# Patient Record
Sex: FEMALE | Race: WHITE
[De-identification: names, ages, dates, MRNs, and addresses within clinical notes are randomized per-mention and may not be internally consistent; named-entity substitution may affect disease eponyms.]

---

## 2017-05-03 ENCOUNTER — HOSPITAL ENCOUNTER (OUTPATIENT)
Dept: HOSPITAL 56 - MW.CHOBGYN | Age: 52
End: 2017-05-03
Attending: ADVANCED PRACTICE MIDWIFE
Payer: COMMERCIAL

## 2017-05-03 DIAGNOSIS — N92.0: Primary | ICD-10-CM

## 2017-05-11 ENCOUNTER — HOSPITAL ENCOUNTER (OUTPATIENT)
Dept: HOSPITAL 56 - MW.SDS | Age: 52
LOS: 1 days | Discharge: HOME | End: 2017-05-12
Attending: OBSTETRICS & GYNECOLOGY
Payer: COMMERCIAL

## 2017-05-11 DIAGNOSIS — D25.9: ICD-10-CM

## 2017-05-11 DIAGNOSIS — N85.00: Primary | ICD-10-CM

## 2017-05-11 DIAGNOSIS — N92.1: ICD-10-CM

## 2017-05-11 DIAGNOSIS — D64.9: ICD-10-CM

## 2017-05-11 LAB
CHLORIDE SERPL-SCNC: 108 MMOL/L (ref 98–110)
SODIUM SERPL-SCNC: 140 MMOL/L (ref 136–146)

## 2017-05-11 PROCEDURE — 86900 BLOOD TYPING SEROLOGIC ABO: CPT

## 2017-05-11 PROCEDURE — 86901 BLOOD TYPING SEROLOGIC RH(D): CPT

## 2017-05-11 PROCEDURE — 86850 RBC ANTIBODY SCREEN: CPT

## 2017-05-11 PROCEDURE — 80048 BASIC METABOLIC PNL TOTAL CA: CPT

## 2017-05-11 PROCEDURE — 58571 TLH W/T/O 250 G OR LESS: CPT

## 2017-05-11 PROCEDURE — 85025 COMPLETE CBC W/AUTO DIFF WBC: CPT

## 2017-05-11 PROCEDURE — 84703 CHORIONIC GONADOTROPIN ASSAY: CPT

## 2017-05-11 PROCEDURE — 36415 COLL VENOUS BLD VENIPUNCTURE: CPT

## 2017-05-11 RX ADMIN — OXYCODONE HYDROCHLORIDE AND ACETAMINOPHEN PRN TAB: 5; 325 TABLET ORAL at 18:36

## 2017-05-11 NOTE — OR
SURGEON:

John Godoy MD

 

DATE OF PROCEDURE:

 

PREOPERATIVE DIAGNOSES:

Menometrorrhagia, fibroid uterus, anemia, and endometrial hyperplasia by

endometrial biopsy.

 

POSTOPERATIVE DIAGNOSES:

Menometrorrhagia, fibroid uterus, anemia, and endometrial hyperplasia by

endometrial biopsy.

 

OPERATION PERFORMED:

Total laparoscopic hysterectomy, laparoscopic bilateral salpingo-oophorectomy,

and cystoscopy.

 

ASSISTANT:

ADELA Morton.

 

ANESTHESIA:

General endotracheal intubation, Dr. Eubanks and Feli Rios.

 

ESTIMATED BLOOD LOSS:

250 mL.

 

COMPLICATIONS:

None.

 

FINDINGS:

Multiple fibroid uterus about 15 to 16-week size.

 

INDICATION FOR SURGERY:

Read or refer to the admit note.

 

PROCEDURE IN DETAIL:

The patient was brought to the OR, properly identified, and after adequate level

of general anesthesia, the patient was placed in lithotomy position with an

access to the abdomen and the vagina.  Goodman catheter was placed in the bladder

and the uterine manipulator and  Ha surgical manipulator placed in

place, then the operation shifted abdominally.  Stab wound done beneath the

umbilicus.  The Veress needle was placed in the peritoneal cavity and that

cavity was insufflated with 6 L of carbon dioxide.  The skin incision was

enlarged to accommodate 5-mm trocar and the scope through it utilizing the

Visiport technique to enter the peritoneal cavity.  Once we entered the

peritoneal cavity, 10/12 trocar was placed in the left iliac fossa under direct

vision and 5-mm trocar in the right iliac fossa.  The patient was placed in

steep Trendelenburg and the operation was started by finding the landmark of the

pelvis.  Then, the superior pedicle coagulated and transected using the ACE-7

Harmonic scapula, and the tubes and ovary included with the specimen.  The same

thing was done with the round ligament.  Then, the anterior leaf of the broad

ligament dissected downward medially pushing the bladder completely away from

the operative field, and then the uterine vessel was identified at the level of

the internal rings of the manipulator.  These uterine vessels were coagulated

and transected with ACE-7 Harmonic scapula making sure that the ureter was away

from harm's way.  Once that was done, then the circular incision at the vaginal

edge of the rings of the manipulator and the uterus was detached from its

attachment to the vagina, the uterus was delivered vaginally with all its

appendage, and then thorough irrigation of the pelvis was done.  There was an

area of the bleeding from the area of the uterine artery on the right side that

was picked individually and Endoloop was applied to it and the bleeding was

stopped.  We proceeded to close the vaginal cuff laparoscopically using 2-0 PDS

interrupted suture.  While we were doing that, we asked the anesthesiologist to

give the patient fluorescein and after that, cystoscopy was performed.  The

bladder was intact.  Both ureteric orifices were seen with urine and dye coming

from both of them.  Thus, the patency of both ureters was verified.  The

cystoscope was removed and the Goodman catheter replaced in the bladder for

drainage.  Instrument and sponge count was correct.  The laparoscopic incision

was closed in layers.  The patient tolerated the procedure well and went to

recovery room in stable general condition.

 

 

JULEE / BHAVNA

DD:  05/11/2017 13:23:17

DT:  05/11/2017 22:10:25

Job #:  [id number]

## 2017-05-11 NOTE — [id number]
Post Anesthesia Note





- EVALUATION WITHIN 48HRS OF ANESTHETIC


Vital Signs in Normal Range: Yes


Patient Participated in Evaluation: Yes


Respiratory Function Stable: Yes


Airway Patent: Yes


Cardiovascular Function Stable: Yes


Hydration Status Stable: Yes


Pain Control Satisfactory: Yes


Nausea and Vomiting Control Satisfactory: No (See note following)


Mental Status Recovered: Yes





- COMMENTS/OBSERVATIONS


Free Text/Narrative:: 





Pt verbalizes frustration over being very sensitive to anesthesia and having 

had excessive nausea and vomiting after all surgeries with only one exception.  

She states that for her "teeth surgery" the anesthesia provider gave her Zofran 

and Phenergan.  While she was still somewhat nauseated, she did not have any 

emesis.  She was not aware of the antinausea modalities with this anesthetic 

today and thought that her scopolamine patch was the only prophylactic or 

treatment given.  I reviewed the anesthesia record and did inform her that she 

received decadron and zofran in addition to the scopolamine patch.  I also 

educated her on the fact that a certain percentage of people have nausea 

regardless of any treatment.  She did say that she had received a "shot" today 

which she thought was phenergan and that helped to relieve her symptoms of 

nausea and vomiting.  In the future patient was advised to request zofran, 

decadron, phenergan and scopolamine patch for prevention and treatment of 

nausea if under anesthesia.  Pt was more calm at end of discussion.  Pt did not 

verbalize any other concerns.

## 2017-05-11 NOTE — PCM.OPNOTE
- General Post-Op/Procedure Note


Date of Surgery/Procedure: 05/11/17


Operative Procedure(s): TLH BSO cysto


Pre Op Diagnosis: juan manuel, Fibroid


Post-Op Diagnosis: Same


Anesthesia Technique: General ET tube


Primary Surgeon: John Godoy


Assistant: Sommer Jackson


EBL in mLs: 200


Complications: None


Condition: Good

## 2017-05-11 NOTE — PCM.PREANE
Preanesthetic Assessment





- Anesthesia/Transfusion/Family Hx


Anesthesia History: Prior Anesthesia Without Reaction


Family History of Anesthesia Reaction: No


Transfusion History: No Prior Transfusion(s)


Intubation History: Unknown





- Review of Systems


General: No Symptoms


Pulmonary: No Symptoms


Cardiovascular: No Symptoms


Gastrointestinal: No symptoms


Neurological: No Symptoms


Other: Reports: None





- Physical Assessment


NPO Status Date: 05/10/17


NPO Status Time: 21:30


O2 Sat by Pulse Oximetry: 97


Respiratory Rate: 16


Vital Signs: 





 Last Vital Signs











Temp  36.9 C   05/11/17 10:05


 


Pulse  72   05/11/17 10:05


 


Resp  16   05/11/17 10:05


 


BP  124/78   05/11/17 10:05


 


Pulse Ox  97   05/11/17 10:05











Height: 1.65 m


Weight: 109.316 kg


ASA Class: 2


Mental Status: Alert & Oriented x3


Airway Class: Mallampati = 2


Dentition: Reports: Normal Dentition


Thyro-Mental Finger Breadths: 3


Mouth Opening Finger Breadths: 3


ROM/Head Extension: Full


Lungs: Clear to auscultation, Normal respiratory effort


Cardiovascular: Regular Rate, Regular Rhythm





- Lab


Values: 





 Laboratory Last Values











WBC  8.70 K/uL (4.0-11.0)   05/11/17  09:52    


 


RBC  3.58 M/uL (4.30-5.90)  L  05/11/17  09:52    


 


Hgb  9.9 g/dL (12.0-16.0)  L  05/11/17  09:52    


 


Hct  31.3 % (36.0-46.0)  L  05/11/17  09:52    


 


MCV  87.4 fL (80.0-98.0)   05/11/17  09:52    


 


MCH  27.7 pg (27.0-32.0)   05/11/17  09:52    


 


MCHC  31.6 g/dL (31.0-37.0)   05/11/17  09:52    


 


RDW Std Deviation  45.6 fl (28.0-62.0)   05/11/17  09:52    


 


RDW Coeff of Felton  15 % (11.0-15.0)   05/11/17  09:52    


 


Plt Count  280 K/uL (150-400)   05/11/17  09:52    


 


MPV  10.30 fL (7.40-12.00)   05/11/17  09:52    


 


Neut % (Auto)  66.0 % (48.0-80.0)   05/11/17  09:52    


 


Lymph % (Auto)  23.7 % (16.0-40.0)   05/11/17  09:52    


 


Mono % (Auto)  4.8 % (0.0-15.0)   05/11/17  09:52    


 


Eos % (Auto)  4.9 % (0.0-7.0)   05/11/17  09:52    


 


Baso % (Auto)  0.6 % (0.0-1.5)   05/11/17  09:52    


 


Neut # (Auto)  5.7 K/uL (1.4-5.7)   05/11/17  09:52    


 


Lymph # (Auto)  2.1 K/uL (0.6-2.4)   05/11/17  09:52    


 


Mono # (Auto)  0.4 K/uL (0.0-0.8)   05/11/17  09:52    


 


Eos # (Auto)  0.4 K/uL (0.0-0.7)   05/11/17  09:52    


 


Baso # (Auto)  0.1 K/uL (0.0-0.1)   05/11/17  09:52    


 


Nucleated RBC %  0.0 /100WBC  05/11/17  09:52    


 


Nucleated RBCs #  0 K/uL  05/11/17  09:52    














- Allergies


Allergies/Adverse Reactions: 


 Allergies











Allergy/AdvReac Type Severity Reaction Status Date / Time


 


codeine Allergy  Hives Verified 05/09/17 16:53














- Blood


Blood Available: No





- Anesthesia Plan


Pre-Op Medication Ordered: None





- Acknowledgements


Anesthesia Type Planned: General Anesthesia


Pt an Appropriate Candidate for the Planned Anesthesia: Yes


Alternatives and Risks of Anesthesia Discussed w Pt/Guardian: Yes


Pt/Guardian Understands and Agrees with Anesthesia Plan: Yes





PreAnesthesia Questionnaire


Other HEENT History: wears glasses


Cardiovascular History: Reports: None


Respiratory History: Reports: None


Gastrointestinal History: Reports: Colon polyp


Genitourinary History: Reports: None


OB/GYN History: Reports: Dysfunctional uterine bleeding, Fibroids


Musculoskeletal History: Reports: None


Neurological History: Reports: None


Psychiatric History: Reports: None


Endocrine/Metabolic History: Reports: Obesity/BMI 30+


Hematologic History: Reports: None


Immunologic History: Reports: None


Oncologic (Cancer) History: Reports: None


Dermatologic History: Reports: Urticaria, Other (see below)


Other Dermatologic History: mole removed from right breast, 

precancerous.....breaks out in hives for no reason





- Past Surgical History


Head Surgeries/Procedures: Reports: None


HEENT Surgical History: Reports: None


Cardiovascular Surgical History: Reports: None


Respiratory Surgical History: Reports: None


GI Surgical History: Reports: Colonoscopy


Female  Surgical History: Reports: Cervical cryotherapy, Tubal ligation


Endocrine Surgical History: Reports: None


Neurological Surgical History: Reports: None


Musculoskeletal Surgical History: Reports: None


Oncologic Surgical History: Reports: None


Dermatological Surgical History: Reports: None


Other Dermatological Surgeries/Procedures: skin lesion biopsy





- SUBSTANCE USE


Smoking Status *Q: Never Smoker


Recreational Drug Use History: No





- HOME MEDS


Home Medications: 


 Home Meds





Iron Tabs 1 tab PO DAILY 05/09/17 [History]


Multivitamin [Daily Multiple Vitamin] 1 tab PO DAILY 05/09/17 [History]











- CURRENT (IN HOUSE) MEDS


Current Meds: 





 Current Medications





Lactated Ringer's (Ringers, Lactated)  1,000 mls @ 125 mls/hr IV ASDIRECTED NOEMI


   Last Admin: 05/11/17 10:02 Dose:  125 mls/hr


Sodium Chloride (Saline Flush)  10 ml FLUSH ASDIRECTED PRN


   PRN Reason: Keep Vein Open


Sodium Chloride (Saline Flush)  2.5 ml FLUSH ASDIRECTED PRN


   PRN Reason: Keep Vein Open





Discontinued Medications





Dexamethasone (Dexamethasone) Confirm Administered Dose 20 mg .ROUTE .STK-MED 

ONE


   Stop: 05/11/17 08:21


Fentanyl (Sublimaze) Confirm Administered Dose 250 mcg .ROUTE .STK-MED ONE


   Stop: 05/11/17 08:22


Fluorescein Sodium (Ak-Fluor) Confirm Administered Dose 5 ml .ROUTE .STK-MED ONE


   Stop: 05/11/17 06:52


Cefazolin Sodium/Dextrose 2 gm (/ Premix)  50 mls @ 100 mls/hr IV ONETIME ONE


   Stop: 05/10/17 09:03


Midazolam HCl (Versed 1 Mg/Ml) Confirm Administered Dose 2 mg .ROUTE .STK-MED 

ONE


   Stop: 05/11/17 08:22


Octyl Cyanoacrylate (Dermabond Advance) Confirm Administered Dose 1 applic 

.ROUTE .STK-MED ONE


   Stop: 05/11/17 10:13


Ondansetron HCl (Zofran) Confirm Administered Dose 4 mg .ROUTE .STK-MED ONE


   Stop: 05/11/17 08:21


Propofol (Diprivan  20 Ml) Confirm Administered Dose 200 mg .ROUTE .STK-MED ONE


   Stop: 05/11/17 08:22


Rocuronium Bromide (Zemuron) Confirm Administered Dose 100 mg .ROUTE .STK-MED 

ONE


   Stop: 05/11/17 08:21

## 2017-05-11 NOTE — PCM.POSTAN
POST ANESTHESIA ASSESSMENT





- MENTAL STATUS


Mental Status: alert, oriented





- RESPIRATORY


Respiratory Status: respiratory rate WNL, airway patent, O2 saturation stable





- CARDIOVASCULAR


CV Status: pulse rate WNL, blood pressure stable





- GASTROINTESTINAL


GI Status: no symptoms





- PAIN


Pain Score: 0





- POST OP HYDRATION


Hydration Status: adequate & stable





- OBSERVATIONS


Free Text/Narrative:: 





no anesthesia problems

## 2017-05-12 VITALS — DIASTOLIC BLOOD PRESSURE: 51 MMHG | SYSTOLIC BLOOD PRESSURE: 96 MMHG

## 2017-05-12 LAB
CHLORIDE SERPL-SCNC: 106 MMOL/L (ref 98–110)
SODIUM SERPL-SCNC: 136 MMOL/L (ref 136–146)

## 2017-05-12 RX ADMIN — OXYCODONE HYDROCHLORIDE AND ACETAMINOPHEN PRN TAB: 5; 325 TABLET ORAL at 03:38

## 2017-05-12 RX ADMIN — OXYCODONE HYDROCHLORIDE AND ACETAMINOPHEN PRN TAB: 5; 325 TABLET ORAL at 11:00

## 2017-05-12 NOTE — PCM.DCSUM1
**Discharge Summary





- Discharge Data


Discharge Date: 05/12/17


Discharge Disposition: Home, Self-Care 01


Condition: Good





- Patient Summary/Data


Operative Procedure(s) Performed: TLH BSO cysto





- Patient Instructions


Diet: Usual Diet as Tolerated


Activity: As Tolerated


Driving: Do Not Drive


Showering/Bathing: May Shower


Wound/Incision Care: Keep Operative Site/Wound Site Clean and Dry


Notify Provider of: Fever, Increased Pain, Swelling and Redness, Drainage, 

Nausea and/or Vomiting





- Discharge Plan


Home Medications: 


 Home Meds





Iron Tabs 1 tab PO DAILY 05/09/17 [History]


Multivitamin [Daily Multiple Vitamin] 1 tab PO DAILY 05/09/17 [History]








Patient Handouts:  Acetaminophen; Oxycodone tablets, Laparoscopically Assisted 

Vaginal Hysterectomy, Care After


Referrals: 


John Godoy MD [Physician] - 05/23/17 8:30 am





- General Info


Date of Service: 05/12/17


Functional Status: Reports: pain controlled





- Review of Systems


General: Reports: No Symptoms


HEENT: Reports: no symptoms


Pulmonary: Reports: no symptoms


Cardiovascular: Reports: No Symptoms


Gastrointestinal: Reports: No symptoms


Genitourinary: Reports: no symptoms


Musculoskeletal: Reports: no symptoms


Skin: Reports: no symptoms


Neurological: Reports: No Symptoms


Psychiatric: Reports: no symptoms





- Patient Data


Vitals - Most Recent: 


 Last Vital Signs











Temp  36.8 C   05/12/17 04:00


 


Pulse  69   05/12/17 04:00


 


Resp  16   05/12/17 04:00


 


BP  96/51 L  05/12/17 04:00


 


Pulse Ox  96   05/12/17 04:00











Weight - Most Recent: 109.316 kg


I&O - Last 24 hours: 


 Intake & Output











 05/11/17 05/12/17 05/12/17





 22:59 06:59 14:59


 


Intake Total 911 1550 


 


Output Total 200 750 


 


Balance 711 800 











Lab Results - Last 24 hrs: 


 Laboratory Results - last 24 hr











  05/11/17 05/11/17 05/11/17 Range/Units





  09:52 09:52 09:52 


 


WBC    8.70  (4.0-11.0)  K/uL


 


RBC    3.58 L  (4.30-5.90)  M/uL


 


Hgb    9.9 L  (12.0-16.0)  g/dL


 


Hct    31.3 L  (36.0-46.0)  %


 


MCV    87.4  (80.0-98.0)  fL


 


MCH    27.7  (27.0-32.0)  pg


 


MCHC    31.6  (31.0-37.0)  g/dL


 


RDW Std Deviation    45.6  (28.0-62.0)  fl


 


RDW Coeff of Felton    15  (11.0-15.0)  %


 


Plt Count    280  (150-400)  K/uL


 


MPV    10.30  (7.40-12.00)  fL


 


Neut % (Auto)    66.0  (48.0-80.0)  %


 


Lymph % (Auto)    23.7  (16.0-40.0)  %


 


Mono % (Auto)    4.8  (0.0-15.0)  %


 


Eos % (Auto)    4.9  (0.0-7.0)  %


 


Baso % (Auto)    0.6  (0.0-1.5)  %


 


Neut # (Auto)    5.7  (1.4-5.7)  K/uL


 


Lymph # (Auto)    2.1  (0.6-2.4)  K/uL


 


Mono # (Auto)    0.4  (0.0-0.8)  K/uL


 


Eos # (Auto)    0.4  (0.0-0.7)  K/uL


 


Baso # (Auto)    0.1  (0.0-0.1)  K/uL


 


Nucleated RBC %    0.0  /100WBC


 


Nucleated RBCs #    0  K/uL


 


Sodium     (136-146)  mmol/L


 


Potassium     (3.5-5.1)  mmol/L


 


Chloride     ()  mmol/L


 


Carbon Dioxide     (21-31)  mmol/L


 


BUN     (6.0-23.0)  mg/dL


 


Creatinine     (0.6-1.5)  mg/dL


 


Est Cr Clr Drug Dosing     mL/min


 


Estimated GFR (MDRD)     ml/min


 


Glucose     ()  mg/dL


 


Calcium     (8.8-10.8)  mg/dL


 


HCG, Qual   NEGATIVE   (NEG)  


 


Blood Type  A POSITIVE    


 


Antibody Screen  NEGATIVE    














  05/11/17 05/12/17 05/12/17 Range/Units





  09:52 05:00 05:00 


 


WBC   21.47 H   (4.0-11.0)  K/uL


 


RBC   2.83 L   (4.30-5.90)  M/uL


 


Hgb   7.9 L   (12.0-16.0)  g/dL


 


Hct   25.1 L   (36.0-46.0)  %


 


MCV   88.7   (80.0-98.0)  fL


 


MCH   27.9   (27.0-32.0)  pg


 


MCHC   31.5   (31.0-37.0)  g/dL


 


RDW Std Deviation   47.3   (28.0-62.0)  fl


 


RDW Coeff of Felton   15   (11.0-15.0)  %


 


Plt Count   252   (150-400)  K/uL


 


MPV   10.80   (7.40-12.00)  fL


 


Neut % (Auto)   92.6 H   (48.0-80.0)  %


 


Lymph % (Auto)   3.3 L   (16.0-40.0)  %


 


Mono % (Auto)   4.1   (0.0-15.0)  %


 


Eos % (Auto)   0.0   (0.0-7.0)  %


 


Baso % (Auto)   0.0   (0.0-1.5)  %


 


Neut # (Auto)   19.9 H   (1.4-5.7)  K/uL


 


Lymph # (Auto)   0.7   (0.6-2.4)  K/uL


 


Mono # (Auto)   0.9 H   (0.0-0.8)  K/uL


 


Eos # (Auto)   0.0   (0.0-0.7)  K/uL


 


Baso # (Auto)   0.0   (0.0-0.1)  K/uL


 


Nucleated RBC %   0.0   /100WBC


 


Nucleated RBCs #   0   K/uL


 


Sodium  140   136  (136-146)  mmol/L


 


Potassium  4.7   4.3  (3.5-5.1)  mmol/L


 


Chloride  108   106  ()  mmol/L


 


Carbon Dioxide  22   22  (21-31)  mmol/L


 


BUN  13   13  (6.0-23.0)  mg/dL


 


Creatinine  0.8   0.8  (0.6-1.5)  mg/dL


 


Est Cr Clr Drug Dosing  75.70   75.70  mL/min


 


Estimated GFR (MDRD)  > 60.0   > 60.0  ml/min


 


Glucose  99   152 H  ()  mg/dL


 


Calcium  8.9   8.5 L  (8.8-10.8)  mg/dL


 


HCG, Qual     (NEG)  


 


Blood Type     


 


Antibody Screen     











Med Orders - Current: 


 Current Medications





Fentanyl (Sublimaze)  50 mcg IVPUSH SEECOMMENT PRN


   PRN Reason: Pain (moderate 4-6)


Lactated Ringer's (Ringers, Lactated)  1,000 mls @ 100 mls/hr IV ASDIRECTED NOEMI


   Last Admin: 05/12/17 03:26 Dose:  100 mls/hr


Ketorolac Tromethamine (Toradol)  30 mg IVPUSH Q6H PRN


   PRN Reason: Pain (severe 7-10)


   Stop: 05/16/17 13:14


Morphine Sulfate (Morphine)  4 mg IVPUSH Q2H PRN


   PRN Reason: Pain (severe 7-10)


Ondansetron HCl (Zofran)  4 mg IVPUSH Q6H PRN


   PRN Reason: Nausea/Vomiting


   Last Admin: 05/11/17 17:46 Dose:  4 mg


Oxycodone/Acetaminophen (Percocet 325-5 Mg)  2 tab PO Q4H PRN


   PRN Reason: Pain (moderate 4-6)


   Last Admin: 05/12/17 03:38 Dose:  2 tab


Promethazine HCl (Phenadoz)  12.5 - 25 mg RECTAL ASDIRECTED PRN


   PRN Reason: Nausea/Vomiting


Promethazine HCl (Phenergan)  25 mg IM Q6H PRN


   PRN Reason: Nausea/Vomiting


Sodium Chloride (Saline Flush)  10 ml FLUSH ASDIRECTED PRN


   PRN Reason: Keep Vein Open


Sodium Chloride (Saline Flush)  2.5 ml FLUSH ASDIRECTED PRN


   PRN Reason: Keep Vein Open





Discontinued Medications





Belladonna Alkaloids/Opium (B & O Supprettes No. 15a)  1 supp RECTAL ONETIME ONE


   Stop: 05/11/17 12:18


   Last Admin: 05/11/17 20:00 Dose:  Not Given


Cefazolin Sodium (Ancef) Confirm Administered Dose 1 gm .ROUTE .STK-MED ONE


   Stop: 05/11/17 11:48


Dexamethasone (Dexamethasone) Confirm Administered Dose 20 mg .ROUTE .STK-MED 

ONE


   Stop: 05/11/17 08:21


Fentanyl (Sublimaze) Confirm Administered Dose 250 mcg .ROUTE .STK-MED ONE


   Stop: 05/11/17 08:22


Fluorescein Sodium (Ak-Fluor) Confirm Administered Dose 5 ml .ROUTE .ST-MED ONE


   Stop: 05/11/17 06:52


Furosemide (Lasix) Confirm Administered Dose 40 mg .ROUTE .ST-MED ONE


   Stop: 05/11/17 11:51


Glycopyrrolate () Confirm Administered Dose 1 mg .ROUTE .STK-MED ONE


   Stop: 05/11/17 12:44


Hydromorphone HCl (Dilaudid) Confirm Administered Dose 2 mg .ROUTE .ST-MED ONE


   Stop: 05/11/17 12:02


Lactated Ringer's (Ringers, Lactated)  1,000 mls @ 125 mls/hr IV ASDIRECTED NOEMI


   Last Admin: 05/11/17 18:09 Dose:  125 mls/hr


Cefazolin Sodium/Dextrose 2 gm (/ Premix)  50 mls @ 100 mls/hr IV ONETIME ONE


   Stop: 05/10/17 09:03


Cefazolin Sodium/Dextrose (Ancef) Confirm Administered Dose 50 mls @ as 

directed .ROUTE .[facility]-MED ONE


   Stop: 05/11/17 11:47


Sodium Chloride (Normal Saline)  500 mls @ 499 drops/min IV .BOLUS NOEMI


   Stop: 05/11/17 17:45


   Last Admin: 05/11/17 17:01 Dose:  499 drops/min


Ketorolac Tromethamine (Toradol)  30 mg IVPUSH ONETIME ONE


   Stop: 05/11/17 13:15


   Last Admin: 05/11/17 20:00 Dose:  Not Given


Midazolam HCl (Versed 1 Mg/Ml) Confirm Administered Dose 2 mg .ROUTE .ST-MED 

ONE


   Stop: 05/11/17 08:22


Neostigmine Methylsulfate (Neostigmine) Confirm Administered Dose 5 mg .ROUTE 

.ST-MED ONE


   Stop: 05/11/17 12:44


Octyl Cyanoacrylate (Dermabond Advance) Confirm Administered Dose 1 applic 

.ROUTE .ST-[facility] ONE


   Stop: 05/11/17 10:13


Ondansetron HCl (Zofran) Confirm Administered Dose 4 mg .ROUTE .STK-MED ONE


   Stop: 05/11/17 08:21


Propofol (Diprivan  20 Ml) Confirm Administered Dose 200 mg .ROUTE .STK-MED ONE


   Stop: 05/11/17 08:22


Rocuronium Bromide (Zemuron) Confirm Administered Dose 100 mg .ROUTE .STOQO-MED 

ONE


   Stop: 05/11/17 08:21











- Exam


General: Reports: alert, oriented


HEENT: Reports: Pupils equal, Pupils reactive, EOMI, Mucous membr. moist/pink


Neck: Reports: supple


Lungs: Reports: Clear to auscultation, Normal respiratory effort


Cardiovascular: Reports: Regular Rate, Regular Rhythm


Abdomen: Reports: bowel sounds present, soft, no tenderness, no distension


 (Female) Exam: Normal External Exam, Normal Speculum Exam, Normal Bimanual 

Exam


Rectal (Female) Exam: Normal Exam, Normal Rectal Tone


Back Exam: Reports: Normal Inspection, Full Range of Motion


Extremities: Reports: no edema, normal pulses


Skin: Reports: warm, dry, intact


Wound/Incisions: Reports: healing well


Neurological: Reports: no new focal deficit


Psy/Mental Status: Reports: alert, normal affect, normal mood





*Q Meaningful Use (DIS)





- VTE *Q


VTE Criteria *Q: 








- Stroke *Q


Stroke Criteria *Q: 








- AMI *Q


AMI Criteria *Q:

## 2017-05-12 NOTE — PCM.SURGPN
- General Info


Date of Service: 05/12/17


Functional Status: Reports: pain controlled





- Review of Systems


General: Reports: No Symptoms


HEENT: Reports: no symptoms


Pulmonary: Reports: no symptoms


Cardiovascular: Reports: No Symptoms


Gastrointestinal: Reports: No symptoms


Genitourinary: Reports: no symptoms


Musculoskeletal: Reports: no symptoms


Skin: Reports: no symptoms


Neurological: Reports: No Symptoms


Psychiatric: Reports: no symptoms





- Patient Data


Vitals - most recent: 


 Last Vital Signs











Temp  36.8 C   05/12/17 04:00


 


Pulse  69   05/12/17 04:00


 


Resp  16   05/12/17 04:00


 


BP  96/51 L  05/12/17 04:00


 


Pulse Ox  96   05/12/17 04:00











Weight - most recent: 109.316 kg


I&O - last 24 hours: 


 Intake & Output











 05/11/17 05/12/17 05/12/17





 22:59 06:59 14:59


 


Intake Total 911 1550 


 


Output Total 200 750 


 


Balance 711 800 











Lab Results last 24 hrs: 


 Laboratory Results - last 24 hr











  05/11/17 05/11/17 05/11/17 Range/Units





  09:52 09:52 09:52 


 


WBC    8.70  (4.0-11.0)  K/uL


 


RBC    3.58 L  (4.30-5.90)  M/uL


 


Hgb    9.9 L  (12.0-16.0)  g/dL


 


Hct    31.3 L  (36.0-46.0)  %


 


MCV    87.4  (80.0-98.0)  fL


 


MCH    27.7  (27.0-32.0)  pg


 


MCHC    31.6  (31.0-37.0)  g/dL


 


RDW Std Deviation    45.6  (28.0-62.0)  fl


 


RDW Coeff of Felton    15  (11.0-15.0)  %


 


Plt Count    280  (150-400)  K/uL


 


MPV    10.30  (7.40-12.00)  fL


 


Neut % (Auto)    66.0  (48.0-80.0)  %


 


Lymph % (Auto)    23.7  (16.0-40.0)  %


 


Mono % (Auto)    4.8  (0.0-15.0)  %


 


Eos % (Auto)    4.9  (0.0-7.0)  %


 


Baso % (Auto)    0.6  (0.0-1.5)  %


 


Neut # (Auto)    5.7  (1.4-5.7)  K/uL


 


Lymph # (Auto)    2.1  (0.6-2.4)  K/uL


 


Mono # (Auto)    0.4  (0.0-0.8)  K/uL


 


Eos # (Auto)    0.4  (0.0-0.7)  K/uL


 


Baso # (Auto)    0.1  (0.0-0.1)  K/uL


 


Nucleated RBC %    0.0  /100WBC


 


Nucleated RBCs #    0  K/uL


 


Sodium     (136-146)  mmol/L


 


Potassium     (3.5-5.1)  mmol/L


 


Chloride     ()  mmol/L


 


Carbon Dioxide     (21-31)  mmol/L


 


BUN     (6.0-23.0)  mg/dL


 


Creatinine     (0.6-1.5)  mg/dL


 


Est Cr Clr Drug Dosing     mL/min


 


Estimated GFR (MDRD)     ml/min


 


Glucose     ()  mg/dL


 


Calcium     (8.8-10.8)  mg/dL


 


HCG, Qual   NEGATIVE   (NEG)  


 


Blood Type  A POSITIVE    


 


Antibody Screen  NEGATIVE    














  05/11/17 05/12/17 05/12/17 Range/Units





  09:52 05:00 05:00 


 


WBC   21.47 H   (4.0-11.0)  K/uL


 


RBC   2.83 L   (4.30-5.90)  M/uL


 


Hgb   7.9 L   (12.0-16.0)  g/dL


 


Hct   25.1 L   (36.0-46.0)  %


 


MCV   88.7   (80.0-98.0)  fL


 


MCH   27.9   (27.0-32.0)  pg


 


MCHC   31.5   (31.0-37.0)  g/dL


 


RDW Std Deviation   47.3   (28.0-62.0)  fl


 


RDW Coeff of Felton   15   (11.0-15.0)  %


 


Plt Count   252   (150-400)  K/uL


 


MPV   10.80   (7.40-12.00)  fL


 


Neut % (Auto)   92.6 H   (48.0-80.0)  %


 


Lymph % (Auto)   3.3 L   (16.0-40.0)  %


 


Mono % (Auto)   4.1   (0.0-15.0)  %


 


Eos % (Auto)   0.0   (0.0-7.0)  %


 


Baso % (Auto)   0.0   (0.0-1.5)  %


 


Neut # (Auto)   19.9 H   (1.4-5.7)  K/uL


 


Lymph # (Auto)   0.7   (0.6-2.4)  K/uL


 


Mono # (Auto)   0.9 H   (0.0-0.8)  K/uL


 


Eos # (Auto)   0.0   (0.0-0.7)  K/uL


 


Baso # (Auto)   0.0   (0.0-0.1)  K/uL


 


Nucleated RBC %   0.0   /100WBC


 


Nucleated RBCs #   0   K/uL


 


Sodium  140   136  (136-146)  mmol/L


 


Potassium  4.7   4.3  (3.5-5.1)  mmol/L


 


Chloride  108   106  ()  mmol/L


 


Carbon Dioxide  22   22  (21-31)  mmol/L


 


BUN  13   13  (6.0-23.0)  mg/dL


 


Creatinine  0.8   0.8  (0.6-1.5)  mg/dL


 


Est Cr Clr Drug Dosing  75.70   75.70  mL/min


 


Estimated GFR (MDRD)  > 60.0   > 60.0  ml/min


 


Glucose  99   152 H  ()  mg/dL


 


Calcium  8.9   8.5 L  (8.8-10.8)  mg/dL


 


HCG, Qual     (NEG)  


 


Blood Type     


 


Antibody Screen     











Med Orders - Current: 


 Current Medications





Fentanyl (Sublimaze)  50 mcg IVPUSH SEECOMMENT PRN


   PRN Reason: Pain (moderate 4-6)


Lactated Ringer's (Ringers, Lactated)  1,000 mls @ 100 mls/hr IV ASDIRECTED [facility]


   Last Admin: 05/12/17 03:26 Dose:  100 mls/hr


Ketorolac Tromethamine (Toradol)  30 mg IVPUSH Q6H PRN


   PRN Reason: Pain (severe 7-10)


   Stop: 05/16/17 13:14


Morphine Sulfate (Morphine)  4 mg IVPUSH Q2H PRN


   PRN Reason: Pain (severe 7-10)


Ondansetron HCl (Zofran)  4 mg IVPUSH Q6H PRN


   PRN Reason: Nausea/Vomiting


   Last Admin: 05/11/17 17:46 Dose:  4 mg


Oxycodone/Acetaminophen (Percocet 325-5 Mg)  2 tab PO Q4H PRN


   PRN Reason: Pain (moderate 4-6)


   Last Admin: 05/12/17 03:38 Dose:  2 tab


Promethazine HCl (Phenadoz)  12.5 - 25 mg RECTAL ASDIRECTED PRN


   PRN Reason: Nausea/Vomiting


Promethazine HCl (Phenergan)  25 mg IM Q6H PRN


   PRN Reason: Nausea/Vomiting


Sodium Chloride (Saline Flush)  10 ml FLUSH ASDIRECTED PRN


   PRN Reason: Keep Vein Open


Sodium Chloride (Saline Flush)  2.5 ml FLUSH ASDIRECTED PRN


   PRN Reason: Keep Vein Open





Discontinued Medications





Belladonna Alkaloids/Opium (B & O Supprettes No. 15a)  1 supp RECTAL ONETIME ONE


   Stop: 05/11/17 12:18


   Last Admin: 05/11/17 20:00 Dose:  Not Given


Cefazolin Sodium (Ancef) Confirm Administered Dose 1 gm .ROUTE .STK-MED ONE


   Stop: 05/11/17 11:48


Dexamethasone (Dexamethasone) Confirm Administered Dose 20 mg .ROUTE .STK-MED 

ONE


   Stop: 05/11/17 08:21


Fentanyl (Sublimaze) Confirm Administered Dose 250 mcg .ROUTE .STK-MED ONE


   Stop: 05/11/17 08:22


Fluorescein Sodium (Ak-Fluor) Confirm Administered Dose 5 ml .ROUTE .STK-MED ONE


   Stop: 05/11/17 06:52


Furosemide (Lasix) Confirm Administered Dose 40 mg .ROUTE .STK-MED ONE


   Stop: 05/11/17 11:51


Glycopyrrolate () Confirm Administered Dose 1 mg .ROUTE .STK-MED ONE


   Stop: 05/11/17 12:44


Hydromorphone HCl (Dilaudid) Confirm Administered Dose 2 mg .ROUTE .STK-MED ONE


   Stop: 05/11/17 12:02


Lactated Ringer's (Ringers, Lactated)  1,000 mls @ 125 mls/hr IV ASDIRECTED [facility]


   Last Admin: 05/11/17 18:09 Dose:  125 mls/hr


Cefazolin Sodium/Dextrose 2 gm (/ Premix)  50 mls @ 100 mls/hr IV ONETIME ONE


   Stop: 05/10/17 09:03


Cefazolin Sodium/Dextrose (Ancef) Confirm Administered Dose 50 mls @ as 

directed .ROUTE .STK-MED ONE


   Stop: 05/11/17 11:47


Sodium Chloride (Normal Saline)  500 mls @ 499 drops/min IV .BOLUS [facility]


   Stop: 05/11/17 17:45


   Last Admin: 05/11/17 17:01 Dose:  499 drops/min


Ketorolac Tromethamine (Toradol)  30 mg IVPUSH ONETIME ONE


   Stop: 05/11/17 13:15


   Last Admin: 05/11/17 20:00 Dose:  Not Given


Midazolam HCl (Versed 1 Mg/Ml) Confirm Administered Dose 2 mg .ROUTE .STK-MED 

ONE


   Stop: 05/11/17 08:22


Neostigmine Methylsulfate (Neostigmine) Confirm Administered Dose 5 mg .ROUTE 

.STK-MED ONE


   Stop: 05/11/17 12:44


Octyl Cyanoacrylate (Dermabond Advance) Confirm Administered Dose 1 applic 

.ROUTE .STK-MED ONE


   Stop: 05/11/17 10:13


Ondansetron HCl (Zofran) Confirm Administered Dose 4 mg .ROUTE .STK-MED ONE


   Stop: 05/11/17 08:21


Propofol (Diprivan  20 Ml) Confirm Administered Dose 200 mg .ROUTE .STK-MED ONE


   Stop: 05/11/17 08:22


Rocuronium Bromide (Zemuron) Confirm Administered Dose 100 mg .ROUTE .STK-MED 

ONE


   Stop: 05/11/17 08:21











- Exam


Wound/Incisions: healing well


General: alert, oriented


HEENT: Pupils equal


Neck: supple


Lungs: Clear to auscultation, Normal respiratory effort


Cardiovascular: Regular Rate, Regular Rhythm


Abdomen: bowel sounds present, soft, no tenderness, no distension


Extremities: no edema


Skin: warm, dry, intact


Neurological: no new focal deficit


Psy/Mental Status: alert, normal affect, normal mood





- Problem List Review


Problem List Initiated/Reviewed/Updated: Yes





- My Orders


Last 24 Hours: 


 Active Orders 24 hr











 Category Date Time Status


 


 Patient Status [ADT] Routine ADT  05/11/17 13:14 Active


 


 Notify Provider Vital Signs [RC] ASDIRECTED Care  05/11/17 13:14 Active


 


 RT Incentive Spirometry [RC] Q2HWA Care  05/11/17 13:14 Active


 


 Up With Assistance [RC] PER UNIT ROUTINE Care  05/11/17 13:14 Active


 


 Up ad Kristie [RC] PER UNIT ROUTINE Care  05/11/17 13:14 Active


 


 Urinary Catheter Removal [RC] Per Unit Routine Care  05/11/17 13:14 Active


 


 Vital Signs [RC] PER UNIT ROUTINE Care  05/11/17 13:14 Active


 


 Regular Diet [DIET] Diet  05/11/17 Dinner Active


 


 Acetaminophen/oxyCODONE [Percocet 325-5 MG] Med  05/11/17 13:14 Active





 2 tab PO Q4H PRN   


 


 Ketorolac [Toradol] Med  05/11/17 13:14 Active





 30 mg IVPUSH Q6H PRN   


 


 Lactated Ringers [Ringers, Lactated] 1,000 ml Med  05/11/17 21:30 Active





 IV ASDIRECTED   


 


 Morphine Med  05/11/17 13:14 Active





 4 mg IVPUSH Q2H PRN   


 


 Ondansetron [Zofran] Med  05/11/17 13:14 Active





 4 mg IVPUSH Q6H PRN   


 


 Promethazine [Phenadoz] Med  05/11/17 12:17 Active





 12.5 - 25 mg RECTAL ASDIRECTED PRN   


 


 Promethazine [Phenergan] Med  05/11/17 13:14 Active





 25 mg IM Q6H PRN   


 


 fentaNYL [Sublimaze] Med  05/11/17 12:17 Active





 50 mcg IVPUSH SEECOMMENT PRN   


 


 Peripheral IV Discontinue [OM.PC] Routine Oth  05/11/17 13:14 Ordered


 


 Sequential Compression Device [OM.PC] Per Unit Routine Oth  05/11/17 13:14 

Ordered


 


 Resuscitation Status Routine Resus Stat  05/11/17 13:14 Ordered








 Medication Orders





Fentanyl (Sublimaze)  50 mcg IVPUSH SEECOMMENT PRN


   PRN Reason: Pain (moderate 4-6)


Lactated Ringer's (Ringers, Lactated)  1,000 mls @ 100 mls/hr IV ASDIRECTED [facility]


   Last Admin: 05/12/17 03:26  Dose: 100 mls/hr


   Infusion: 05/12/17 03:26  Dose: 100 mls/hr


   Admin: 05/11/17 21:30  Dose: 100 mls/hr


Ketorolac Tromethamine (Toradol)  30 mg IVPUSH Q6H PRN


   PRN Reason: Pain (severe 7-10)


   Stop: 05/16/17 13:14


Morphine Sulfate (Morphine)  4 mg IVPUSH Q2H PRN


   PRN Reason: Pain (severe 7-10)


Ondansetron HCl (Zofran)  4 mg IVPUSH Q6H PRN


   PRN Reason: Nausea/Vomiting


   Last Admin: 05/11/17 17:46  Dose: 4 mg


Oxycodone/Acetaminophen (Percocet 325-5 Mg)  2 tab PO Q4H PRN


   PRN Reason: Pain (moderate 4-6)


   Last Admin: 05/12/17 03:38  Dose: 2 tab


   Admin: 05/11/17 18:36  Dose: 2 tab


Promethazine HCl (Phenadoz)  12.5 - 25 mg RECTAL ASDIRECTED PRN


   PRN Reason: Nausea/Vomiting


Promethazine HCl (Phenergan)  25 mg IM Q6H PRN


   PRN Reason: Nausea/Vomiting


Sodium Chloride (Saline Flush)  10 ml FLUSH ASDIRECTED PRN


   PRN Reason: Keep Vein Open


Sodium Chloride (Saline Flush)  2.5 ml FLUSH ASDIRECTED PRN


   PRN Reason: Keep Vein Open











- Assessment


Assessment           (Free Text/Narrative):: 





Status post total laparoscopic hysterectomy and laparoscopic bilateral salpingo-

oophorectomy postoperative day #1 patient doing well her lab works is stable 

she is ambulatory she is voiding without any problem she is on regular diet 

tolerated very well





- Plan


Plan                        (Free Text/Narrative):: 





Patient will be sent home today post hysterectomy instruction is given to the 

patient prescription for Percocet 7.5/325 was given for postoperative pain the 

post hysterectomy instruction is given to the patient and the patient to have 

an appointment for followup in 2 weeks

## 2017-07-13 ENCOUNTER — HOSPITAL ENCOUNTER (OUTPATIENT)
Dept: HOSPITAL 56 - MW.SDS | Age: 52
Discharge: HOME | End: 2017-07-13
Attending: SURGERY
Payer: COMMERCIAL

## 2017-07-13 VITALS — DIASTOLIC BLOOD PRESSURE: 65 MMHG | SYSTOLIC BLOOD PRESSURE: 106 MMHG

## 2017-07-13 DIAGNOSIS — K64.1: ICD-10-CM

## 2017-07-13 DIAGNOSIS — K57.30: ICD-10-CM

## 2017-07-13 DIAGNOSIS — Z98.51: ICD-10-CM

## 2017-07-13 DIAGNOSIS — Z88.5: ICD-10-CM

## 2017-07-13 DIAGNOSIS — Z90.710: ICD-10-CM

## 2017-07-13 DIAGNOSIS — Z86.010: ICD-10-CM

## 2017-07-13 DIAGNOSIS — Z79.899: ICD-10-CM

## 2017-07-13 DIAGNOSIS — Z98.890: ICD-10-CM

## 2017-07-13 DIAGNOSIS — Z90.79: ICD-10-CM

## 2017-07-13 DIAGNOSIS — Z12.11: Primary | ICD-10-CM

## 2017-07-13 PROCEDURE — 0DJD8ZZ INSPECTION OF LOWER INTESTINAL TRACT, VIA NATURAL OR ARTIFICIAL OPENING ENDOSCOPIC: ICD-10-PCS | Performed by: SURGERY

## 2017-07-13 PROCEDURE — 81025 URINE PREGNANCY TEST: CPT

## 2017-07-13 PROCEDURE — 45378 DIAGNOSTIC COLONOSCOPY: CPT

## 2017-07-13 NOTE — [id number]
Post Anesthesia Note





- EVALUATION WITHIN 48HRS OF ANESTHETIC


Vital Signs in Normal Range: Yes


Patient Participated in Evaluation: Yes


Respiratory Function Stable: Yes


Airway Patent: Yes


Cardiovascular Function Stable: Yes


Hydration Status Stable: Yes


Pain Control Satisfactory: Yes


Nausea and Vomiting Control Satisfactory: Yes


Mental Status Recovered: Yes





- COMMENTS/OBSERVATIONS


Free Text/Narrative:: 





no anesthesia problems

## 2017-07-13 NOTE — PCM.POSTAN
POST ANESTHESIA ASSESSMENT





- MENTAL STATUS


Mental Status: alert





- RESPIRATORY


Respiratory Status: respiratory rate WNL, airway patent, O2 saturation stable





- CARDIOVASCULAR


CV Status: pulse rate WNL, blood pressure stable





- GASTROINTESTINAL


GI Status: no symptoms





- PAIN


Pain Score: 0





- POST OP HYDRATION


Hydration Status: adequate & stable





- OBSERVATIONS


Free Text/Narrative:: 





no anesthesia problems

## 2017-07-13 NOTE — PCM.PREANE
Preanesthetic Assessment





- Anesthesia/Transfusion/Family Hx


Anesthesia History: Prior Anesthesia Without Reaction


Family History of Anesthesia Reaction: No


Transfusion History: No Prior Transfusion(s)


Intubation History: Unknown





- Review of Systems


General: No Symptoms


Pulmonary: No Symptoms


Cardiovascular: No Symptoms


Gastrointestinal: No symptoms


Neurological: No Symptoms


Other: Reports: None





- Physical Assessment


Height: 1.65 m


Weight: 108.409 kg


ASA Class: 2


Mental Status: Alert & Oriented x3


Airway Class: Mallampati = 2


Dentition: Reports: Normal Dentition


Thyro-Mental Finger Breadths: 3


Mouth Opening Finger Breadths: 3


ROM/Head Extension: Full


Lungs: Clear to auscultation, Normal respiratory effort


Cardiovascular: Regular Rate, Regular Rhythm





- Lab


Values: 





 Laboratory Last Values











Urine HCG, Qual  NEGATIVE  (NEGATIVE)   07/13/17  10:45    














- Allergies


Allergies/Adverse Reactions: 


 Allergies











Allergy/AdvReac Type Severity Reaction Status Date / Time


 


codeine Allergy  Hives Verified 05/09/17 16:53














- Blood


Blood Available: No





- Anesthesia Plan


Pre-Op Medication Ordered: None





- Acknowledgements


Anesthesia Type Planned: MAC


Pt an Appropriate Candidate for the Planned Anesthesia: Yes


Alternatives and Risks of Anesthesia Discussed w Pt/Guardian: Yes


Pt/Guardian Understands and Agrees with Anesthesia Plan: Yes





PreAnesthesia Questionnaire


HEENT History: Reports: Other (See Below)


Other HEENT History: wears glasses


Cardiovascular History: Reports: None


Respiratory History: Reports: None


Gastrointestinal History: Reports: Colon Polyp, Diverticulosis


Genitourinary History: Reports: None


OB/GYN History: Reports: Dysfunctional Uterine Bleeding, Fibroids


Musculoskeletal History: Reports: None


Neurological History: Reports: None


Psychiatric History: Reports: None


Endocrine/Metabolic History: Reports: Obesity/BMI 30+


Hematologic History: Reports: Anemia


Immunologic History: Reports: None


Oncologic (Cancer) History: Reports: None


Dermatologic History: Reports: Urticaria, Other (See Below)


Other Dermatologic History: mole removed from right breast, precancerous





- Past Surgical History


Head Surgeries/Procedures: Reports: None


HEENT Surgical History: Reports: None


Cardiovascular Surgical History: Reports: None


Respiratory Surgical History: Reports: None


GI Surgical History: Reports: Colonoscopy (x2 '10 and '11)


Female  Surgical History: Reports: Cervical Cryotherapy, Hysterectomy, 

Salpingo-Oophorectomy, Tubal Ligation


Endocrine Surgical History: Reports: None


Neurological Surgical History: Reports: None


Musculoskeletal Surgical History: Reports: None


Oncologic Surgical History: Reports: None


Dermatological Surgical History: 


Other Dermatological Surgeries/Procedures: skin lesion biopsy





- SUBSTANCE USE


Smoking Status *Q: Never Smoker


Recreational Drug Use History: No





- HOME MEDS


Home Medications: 


 Home Meds





Iron Tabs 1 tab PO DAILY 05/09/17 [History]


Multivitamin [Daily Multiple Vitamin] 1 tab PO DAILY 05/09/17 [History]


Omega-3/DHA/Epa/Fish Oil [Omega-3 Fish Oil 1,000 MG Sfgl] 1 tab PO ASDIRECTED 07 /07/17 [History]


Polyethylene Glycol 3350 [Miralax] 1 dose PO ASDIRECTED PRN 07/07/17 [History]











- CURRENT (IN HOUSE) MEDS


Current Meds: 





 Current Medications





Lactated Ringer's (Ringers, Lactated)  1,000 mls @ 125 mls/hr IV ASDIRECTED [facility]


Sodium Chloride (Saline Flush)  10 ml FLUSH ASDIRECTED PRN


   PRN Reason: Keep Vein Open


Sodium Chloride (Saline Flush)  2.5 ml FLUSH ASDIRECTED PRN


   PRN Reason: Keep Vein Open





Discontinued Medications





Fentanyl (Sublimaze) Confirm Administered Dose 100 mcg .ROUTE .STK-MED ONE


   Stop: 07/13/17 11:25


Lidocaine (Xylocaine-Mpf 2%) Confirm Administered Dose 5 ml .ROUTE .STK-MED ONE


   Stop: 07/13/17 11:25


Propofol (Diprivan  20 Ml) Confirm Administered Dose 400 mg .ROUTE .STK-MED ONE


   Stop: 07/13/17 11:25

## 2017-07-14 NOTE — OR
SURGEON:

DAVID JACKSON MD

 

DATE OF PROCEDURE:  07/13/2017

 

PREOPERATIVE DIAGNOSIS:

History of colon polyps.

 

POSTOPERATIVE DIAGNOSES:

Diverticulosis, grade 2 hemorrhoids.

 

PROCEDURE PERFORMED:

Diagnostic colonoscopy.

 

ANESTHESIA:

MAC.

 

EXTENT OF THE EXAM:

To the cecum.

 

PREPARATION:

Good.

 

LIMITATIONS:

None.

 

INDICATIONS FOR EXAMINATION:

The patient is a 52-year-old female, who has a history of colon polyps removed 7-

8 years ago.  The patient has not followed up since and is due for a repeat

colonoscopy.  We discussed the procedure as well as the risks including

bleeding, infection, or damage to surrounding structures, including perforation.

The patient verbalized understanding and wishes to proceed.

 

PROCEDURE IN DETAIL:

The patient was brought into the endoscopy suite and placed on the cart in a

left lateral decubitus position.  A time-out was completed verifying the

patient's name, age, date of birth, allergies, and procedure to be performed.

Monitored anesthesia care was induced and continuous oxygen was provided via

nasal cannula throughout the procedure.  After adequate sedation was achieved, a

digital rectal exam was performed.  This is consistent with grade 2 hemorrhoids.

A well lubricated colonoscope was inserted into the rectum and advanced under

direct visualization to the level of the cecum.  The cecum was identified by

both visual and anatomic landmarks.  A photograph was taken of the cecal cap,

however, I was unable to retroflex the scope within the cecum.  The scope was

fully withdrawn while examining the color, texture, anatomy, and integrity of

the mucosa from the cecum to the anal canal.  This is consistent with diffuse

diverticulosis throughout the colon.  The scope was brought into the rectum and

retroflexed to allow visualization of the anal canal opening.  This appeared

normal and a photograph was taken.  The scope was straightened out and removed

from the patient.  The cecum to anus time was 8 minutes.  The patient tolerated

the procedure well and was taken to the PACU in stable condition.

 

ENDOSCOPIC DIAGNOSIS:

Diverticulosis, grade 2 hemorrhoids.

 

RECOMMENDATION:

Follow up in clinic in 2 weeks.

 

 

MEAGHAN HUGGINS

DD:  07/14/2017 08:00:56

DT:  07/14/2017 09:04:56

Job #:  [id number]

## 2019-02-04 ENCOUNTER — HOSPITAL ENCOUNTER (EMERGENCY)
Dept: HOSPITAL 56 - MW.ED | Age: 54
Discharge: HOME | End: 2019-02-04
Payer: COMMERCIAL

## 2019-02-04 VITALS — DIASTOLIC BLOOD PRESSURE: 88 MMHG | SYSTOLIC BLOOD PRESSURE: 116 MMHG

## 2019-02-04 DIAGNOSIS — Z88.5: ICD-10-CM

## 2019-02-04 DIAGNOSIS — R07.9: Primary | ICD-10-CM

## 2019-02-04 DIAGNOSIS — E66.9: ICD-10-CM

## 2019-02-04 LAB
CHLORIDE SERPL-SCNC: 105 MMOL/L (ref 98–107)
SODIUM SERPL-SCNC: 140 MMOL/L (ref 136–145)

## 2019-02-04 PROCEDURE — 82150 ASSAY OF AMYLASE: CPT

## 2019-02-04 PROCEDURE — 84484 ASSAY OF TROPONIN QUANT: CPT

## 2019-02-04 PROCEDURE — 85025 COMPLETE CBC W/AUTO DIFF WBC: CPT

## 2019-02-04 PROCEDURE — 86677 HELICOBACTER PYLORI ANTIBODY: CPT

## 2019-02-04 PROCEDURE — 85610 PROTHROMBIN TIME: CPT

## 2019-02-04 PROCEDURE — 36415 COLL VENOUS BLD VENIPUNCTURE: CPT

## 2019-02-04 PROCEDURE — 99285 EMERGENCY DEPT VISIT HI MDM: CPT

## 2019-02-04 PROCEDURE — 80053 COMPREHEN METABOLIC PANEL: CPT

## 2019-02-04 PROCEDURE — 71045 X-RAY EXAM CHEST 1 VIEW: CPT

## 2019-02-04 PROCEDURE — 93005 ELECTROCARDIOGRAM TRACING: CPT

## 2019-02-04 PROCEDURE — 83690 ASSAY OF LIPASE: CPT

## 2019-02-04 NOTE — EDM.PDOC
ED HPI GENERAL MEDICAL PROBLEM





- General


Chief Complaint: Chest Pain


Stated Complaint: CHEST PAIN


Time Seen by Provider: 02/04/19 08:42





- History of Present Illness


INITIAL COMMENTS - FREE TEXT/NARRATIVE: 





HISTORY AND PHYSICAL:





History of present illness:


The patient is a 53-year-old female was no significant past medical history and 

who had a total hysterectomy and presents with 4 days of episodic midsternal 

chest pain which she describes as a cramping aching like sensation. She says 

that it only lasts for a few minutes when it occurs and it occurs about 15 

times a day for the last 4 days. It never wakes her from sleep and is not 

associated with any other symptoms such as nausea vomiting diaphoresis or 

shortness of breath. She does say that activity makes it worse and she says 

that yesterday when she was shoveling some snow she did not have the discomfort 

during activity but she did have it afterwards and also when she was going up 

and downstairs to do laundry. When the pain occurs she rates it as a 2-3/10 and 

sometimes there is an undercurrent of an aching sensation but most of the time 

it goes away completely. She has not taken any medicines specifically for this 

pain. Currently in the ED she has no discomfort. She had a stress test about 5 

years ago that was normal but she has never had any other cardiac workup and 

has not had a lipid or cholesterol profile performed recently. She is not sure 

of her father had cardiac disease as it was a long time ago and her mom has a 

history of A. fib but there is no other cardiac disease in the family. Patient 

denies social history and no recent long trips. She has no leg pain or 

swelling. She has no upper respiratory symptoms. Currently in the ED she tells 

me that she only came in today because she knows something that she needs to 

look into that she is asymptomatic. The patient denies any epigastric pain and 

has no acid reflux-like sensations and no food intolerance.





Review of systems: 


As per history of present illness and below otherwise all systems reviewed and 

negative.





Past medical history: 


As per history of present illness and as reviewed below otherwise 

noncontributory.





Surgical history: 


As per history of present illness and as reviewed below otherwise 

noncontributory.





Social history: 


No reported history of drug or alcohol abuse.





Family history: 


As per history of present illness and as reviewed below otherwise 

noncontributory.





Physical exam:


General: Well-developed well-nourished mildly overweight female who is nontoxic 

and vital signs were noted by me.


HEENT: Atraumatic, normocephalic,  negative for conjunctival pallor or scleral 

icterus, mucous membranes moist, throat clear, neck supple, nontender, trachea 

midline.


Lungs: Clear to auscultation, breath sounds equal bilaterally, chest nontender.


Heart: S1S2, regular rate and rhythm no overt murmurs


Abdomen: Soft, nondistended, nontender. Negative for masses or 

hepatosplenomegaly. NABS


Pelvis: Stable nontender.


Genitourinary: Deferred.


Rectal: Deferred.


Extremities: Atraumatic, negative for cords or calf pain. Neurovascular 

unremarkable. No pedal edema or leg asymmetry


Neuro: Awake, alert, oriented. Cranial nerves II through XII unremarkable. 

Cerebellum unremarkable. Motor and sensory unremarkable throughout. Exam 

nonfocal.





Diagnostics:


EKG chest x-ray CBC CMP INR troponin amylase lipase H. pylori





Therapeutics:


IV O2 monitor aspirin





Discussed with patient and  at bedside all testing results and have 

offered her observation admission for these symptoms. She understands my 

concerns and accepts them and would prefer to do an outpatient follow-up. We 

will place her name on an expedited follow-up list and I've advised her on 

reasons to return to the ED. She understands that I cannot actually say that 

this is not reactive in etiology without the observation admission and she 

still declines





Impression: 


Episodic chest pain





Definitive disposition and diagnosis as appropriate pending reevaluation and 

review of above.


  ** left chest


Pain Score (Numeric/FACES): 2





- Related Data


 Allergies











Allergy/AdvReac Type Severity Reaction Status Date / Time


 


codeine Allergy  Hives Verified 02/04/19 08:50











Home Meds: 


 Home Meds





Multivitamin [Daily Multiple Vitamin] 1 tab PO DAILY 05/09/17 [History]


Omega-3/DHA/Epa/Fish Oil [Omega-3 Fish Oil 1,000 MG Sfgl] 1 tab PO ASDIRECTED 07 /07/17 [History]


Cholecalciferol (Vitamin D3) [Vitamin D] 2,000 units PO DAILY 02/04/19 [History]











Past Medical History


HEENT History: Reports: Other (See Below)


Other HEENT History: wears glasses


Cardiovascular History: Reports: None


Respiratory History: Reports: None


Gastrointestinal History: Reports: Colon Polyp, Diverticulosis


Genitourinary History: Reports: None


OB/GYN History: Reports: Dysfunctional Uterine Bleeding, Fibroids


Musculoskeletal History: Reports: None


Neurological History: Reports: None


Psychiatric History: Reports: None


Endocrine/Metabolic History: Reports: Obesity/BMI 30+


Hematologic History: Reports: Anemia


Immunologic History: Reports: None


Oncologic (Cancer) History: Reports: None


Dermatologic History: Reports: Urticaria, Other (See Below)


Other Dermatologic History: mole removed from right breast, precancerous





- Past Surgical History


Head Surgeries/Procedures: Reports: None


HEENT Surgical History: Reports: None


Cardiovascular Surgical History: Reports: None


Respiratory Surgical History: Reports: None


GI Surgical History: Reports: Colonoscopy (x2 '10 and '11)


Female  Surgical History: Reports: Cervical Cryotherapy, Hysterectomy, 

Salpingo-Oophorectomy, Tubal Ligation


Endocrine Surgical History: Reports: None


Neurological Surgical History: Reports: None


Musculoskeletal Surgical History: Reports: None


Oncologic Surgical History: Reports: None


Dermatological Surgical History: 


Other Dermatological Surgeries/Procedures: skin lesion biopsy





Social & Family History





- Caffeine Use


Caffeine Use: Reports: Coffee





ED ROS GENERAL





- Review of Systems


Review Of Systems: ROS reveals no pertinent complaints other than HPI.





ED EXAM, GENERAL





- Physical Exam


Exam: See Below (see Dictation)





Course





- Vital Signs


Last Recorded V/S: 


 Last Vital Signs











Temp  35.8 C   02/04/19 08:44


 


Pulse  59 L  02/04/19 09:08


 


Resp  18   02/04/19 09:08


 


BP  116/88   02/04/19 09:08


 


Pulse Ox  98   02/04/19 09:08














- Orders/Labs/Meds


Orders: 


 Active Orders 24 hr











 Category Date Time Status


 


 Cardiac Monitoring [RC] .AS DIRECTED Care  02/04/19 08:43 Active


 


 EKG Documentation Completion [RC] STAT Care  02/04/19 08:43 Active


 


 Oxygen Therapy, ED [RC] ASDIRECTED Care  02/04/19 08:43 Active


 


 Pulse Oximetry [RC] ASDIRECTED Care  02/04/19 08:43 Active


 


 Sodium Chloride 0.9% [Saline Flush] Med  02/04/19 08:43 Active





 10 ml FLUSH ASDIRECTED PRN   


 


 Sodium Chloride 0.9% [Saline Flush] Med  02/04/19 08:43 Active





 2.5 ml FLUSH ASDIRECTED PRN   


 


 Saline Lock Insert [OM.PC] Stat Oth  02/04/19 08:43 Ordered








 Medication Orders





Sodium Chloride (Saline Flush)  10 ml FLUSH ASDIRECTED PRN


   PRN Reason: Keep Vein Open


   Last Admin: 02/04/19 09:07  Dose: 10 ml


Sodium Chloride (Saline Flush)  2.5 ml FLUSH ASDIRECTED PRN


   PRN Reason: Keep Vein Open


   Last Admin: 02/04/19 09:07  Dose: 2.5 ml








Labs: 


 Laboratory Tests











  02/04/19 02/04/19 02/04/19 Range/Units





  08:50 08:50 08:50 


 


WBC  7.21    (4.0-11.0)  K/uL


 


RBC  4.79    (4.30-5.90)  M/uL


 


Hgb  14.4    (12.0-16.0)  g/dL


 


Hct  42.6    (36.0-46.0)  %


 


MCV  88.9    (80.0-98.0)  fL


 


MCH  30.1    (27.0-32.0)  pg


 


MCHC  33.8    (31.0-37.0)  g/dL


 


RDW Std Deviation  43.9    (28.0-62.0)  fl


 


RDW Coeff of Felton  13    (11.0-15.0)  %


 


Plt Count  225    (150-400)  K/uL


 


MPV  11.50    (7.40-12.00)  fL


 


Neut % (Auto)  57.9    (48.0-80.0)  %


 


Lymph % (Auto)  27.2    (16.0-40.0)  %


 


Mono % (Auto)  6.7    (0.0-15.0)  %


 


Eos % (Auto)  7.8 H    (0.0-7.0)  %


 


Baso % (Auto)  0.4    (0.0-1.5)  %


 


Neut # (Auto)  4.2    (1.4-5.7)  K/uL


 


Lymph # (Auto)  2.0    (0.6-2.4)  K/uL


 


Mono # (Auto)  0.5    (0.0-0.8)  K/uL


 


Eos # (Auto)  0.6    (0.0-0.7)  K/uL


 


Baso # (Auto)  0.0    (0.0-0.1)  K/uL


 


Nucleated RBC %  0.0    /100WBC


 


Nucleated RBCs #  0    K/uL


 


INR   0.97   


 


Sodium    140  (136-145)  mmol/L


 


Potassium    4.1  (3.5-5.1)  mmol/L


 


Chloride    105  ()  mmol/L


 


Carbon Dioxide    26.0  (21.0-32.0)  mmol/L


 


BUN    20 H  (7.0-18.0)  mg/dL


 


Creatinine    0.9  (0.6-1.0)  mg/dL


 


Est Cr Clr Drug Dosing    66.54  mL/min


 


Estimated GFR (MDRD)    > 60.0  ml/min


 


Glucose    110 H  ()  mg/dL


 


Calcium    9.6  (8.5-10.1)  mg/dL


 


Total Bilirubin    0.6  (0.2-1.0)  mg/dL


 


AST    21  (15-37)  IU/L


 


ALT    52  (14-63)  IU/L


 


Alkaline Phosphatase    73  ()  U/L


 


Troponin I    < 0.050  (0.000-0.056)  ng/mL


 


Total Protein    7.8  (6.4-8.2)  g/dL


 


Albumin    4.0  (3.4-5.0)  g/dL


 


Globulin    3.8  (2.6-4.0)  g/dL


 


Albumin/Globulin Ratio    1.1  (0.9-1.6)  


 


Amylase    31  ()  U/L


 


Lipase    132  ()  U/L


 


H. pylori IgG Antibody     (NEG)  














  02/04/19 Range/Units





  08:50 


 


WBC   (4.0-11.0)  K/uL


 


RBC   (4.30-5.90)  M/uL


 


Hgb   (12.0-16.0)  g/dL


 


Hct   (36.0-46.0)  %


 


MCV   (80.0-98.0)  fL


 


MCH   (27.0-32.0)  pg


 


MCHC   (31.0-37.0)  g/dL


 


RDW Std Deviation   (28.0-62.0)  fl


 


RDW Coeff of Felton   (11.0-15.0)  %


 


Plt Count   (150-400)  K/uL


 


MPV   (7.40-12.00)  fL


 


Neut % (Auto)   (48.0-80.0)  %


 


Lymph % (Auto)   (16.0-40.0)  %


 


Mono % (Auto)   (0.0-15.0)  %


 


Eos % (Auto)   (0.0-7.0)  %


 


Baso % (Auto)   (0.0-1.5)  %


 


Neut # (Auto)   (1.4-5.7)  K/uL


 


Lymph # (Auto)   (0.6-2.4)  K/uL


 


Mono # (Auto)   (0.0-0.8)  K/uL


 


Eos # (Auto)   (0.0-0.7)  K/uL


 


Baso # (Auto)   (0.0-0.1)  K/uL


 


Nucleated RBC %   /100WBC


 


Nucleated RBCs #   K/uL


 


INR   


 


Sodium   (136-145)  mmol/L


 


Potassium   (3.5-5.1)  mmol/L


 


Chloride   ()  mmol/L


 


Carbon Dioxide   (21.0-32.0)  mmol/L


 


BUN   (7.0-18.0)  mg/dL


 


Creatinine   (0.6-1.0)  mg/dL


 


Est Cr Clr Drug Dosing   mL/min


 


Estimated GFR (MDRD)   ml/min


 


Glucose   ()  mg/dL


 


Calcium   (8.5-10.1)  mg/dL


 


Total Bilirubin   (0.2-1.0)  mg/dL


 


AST   (15-37)  IU/L


 


ALT   (14-63)  IU/L


 


Alkaline Phosphatase   ()  U/L


 


Troponin I   (0.000-0.056)  ng/mL


 


Total Protein   (6.4-8.2)  g/dL


 


Albumin   (3.4-5.0)  g/dL


 


Globulin   (2.6-4.0)  g/dL


 


Albumin/Globulin Ratio   (0.9-1.6)  


 


Amylase   ()  U/L


 


Lipase   ()  U/L


 


H. pylori IgG Antibody  NEGATIVE  (NEG)  











Meds: 


Medications











Generic Name Dose Route Start Last Admin





  Trade Name Freq  PRN Reason Stop Dose Admin


 


Sodium Chloride  10 ml  02/04/19 08:43  02/04/19 09:07





  Saline Flush  FLUSH   10 ml





  ASDIRECTED PRN   Administration





  Keep Vein Open   





     





     





     


 


Sodium Chloride  2.5 ml  02/04/19 08:43  02/04/19 09:07





  Saline Flush  FLUSH   2.5 ml





  ASDIRECTED PRN   Administration





  Keep Vein Open   





     





     





     














Discontinued Medications














Generic Name Dose Route Start Last Admin





  Trade Name Freq  PRN Reason Stop Dose Admin


 


Aspirin  324 mg  02/04/19 08:50  02/04/19 09:07





  Aspirin  PO  02/04/19 08:51  324 mg





  ONETIME ONE   Administration





     





     





     





     














Departure





- Departure


Time of Disposition: 10:07


Disposition: Home, Self-Care 01


Condition: Good


Clinical Impression: 


Chest pain


Qualifiers:


 Chest pain type: unspecified Qualified Code(s): R07.9 - Chest pain, unspecified








- Discharge Information


Forms:  ED Department Discharge


Additional Instructions: 


The following information is given to patients seen in the emergency department 

who are being discharged to home. This information is to outline your options 

for follow-up care. We provide all patients seen in our emergency department 

with a follow-up referral.





The need for follow-up, as well as the timing and circumstances, are variable 

depending upon the specifics of your emergency department visit.





If you don't have a primary care physician on staff, we will provide you with a 

referral. We always advise you to contact your personal physician following an 

emergency department visit to inform them of the circumstance of the visit and 

for follow-up with them and/or the need for any referrals to a consulting 

specialist.





The emergency department will also refer you to a specialist when appropriate. 

This referral assures that you have the opportunity for followup care with a 

specialist. All of these measure are taken in an effort to provide you with 

optimal care, which includes your followup.





Under all circumstances we always encourage you to contact your private 

physician who remains a resource for coordinating  your care. When calling for 

followup care, please make the office aware that this follow-up is from your 

recent emergency room visit. If for any reason you are refused follow-up, 

please contact the [facility] emergency 

department at [phone] and ask to speak to the emergency department 

charge nurse.





[facility] 


Primary care- [place] [address]


[phone]








Please call and schedule a follow-up appointment in our clinic to get further 

testing, such as a stress test, organized as an outpatient as we discussed. 

Until you are followed up in the clinic , please take one 325 milligram aspirin 

daily. Refrain from strenuous activities until you're followed up and return to 

ER as needed and as discussed.





- My Orders


Last 24 Hours: 


My Active Orders





02/04/19 08:43


Cardiac Monitoring [RC] .AS DIRECTED 


EKG Documentation Completion [RC] STAT 


Oxygen Therapy, ED [RC] ASDIRECTED 


Pulse Oximetry [RC] ASDIRECTED 


Sodium Chloride 0.9% [Saline Flush]   10 ml FLUSH ASDIRECTED PRN 


Sodium Chloride 0.9% [Saline Flush]   2.5 ml FLUSH ASDIRECTED PRN 


Saline Lock Insert [OM.PC] Stat 














- Assessment/Plan


Last 24 Hours: 


My Active Orders





02/04/19 08:43


Cardiac Monitoring [RC] .AS DIRECTED 


EKG Documentation Completion [RC] STAT 


Oxygen Therapy, ED [RC] ASDIRECTED 


Pulse Oximetry [RC] ASDIRECTED 


Sodium Chloride 0.9% [Saline Flush]   10 ml FLUSH ASDIRECTED PRN 


Sodium Chloride 0.9% [Saline Flush]   2.5 ml FLUSH ASDIRECTED PRN 


Saline Lock Insert [OM.PC] Stat

## 2019-02-04 NOTE — CR
EXAMINATION: Portable chest radiograph.

 

HISTORY: No evidence of breath.

 

FINDINGS: 

The trachea is midline. The cardiomediastinal silhouette is within normal

limits. No pulmonary infiltrates, effusions or pneumothorax.

 

Osseous structures appear unremarkable.

 

IMPRESSION: 

No acute cardiopulmonary process.

## 2020-06-09 ENCOUNTER — HOSPITAL ENCOUNTER (OUTPATIENT)
Dept: HOSPITAL 56 - MW.SDS | Age: 55
Discharge: HOME | End: 2020-06-09
Attending: SURGERY
Payer: COMMERCIAL

## 2020-06-09 VITALS — HEART RATE: 68 BPM | SYSTOLIC BLOOD PRESSURE: 106 MMHG | DIASTOLIC BLOOD PRESSURE: 74 MMHG

## 2020-06-09 DIAGNOSIS — Z79.899: ICD-10-CM

## 2020-06-09 DIAGNOSIS — Z88.5: ICD-10-CM

## 2020-06-09 DIAGNOSIS — E11.9: ICD-10-CM

## 2020-06-09 DIAGNOSIS — J45.909: ICD-10-CM

## 2020-06-09 DIAGNOSIS — E66.9: ICD-10-CM

## 2020-06-09 DIAGNOSIS — Z79.84: ICD-10-CM

## 2020-06-09 DIAGNOSIS — K43.2: Primary | ICD-10-CM

## 2020-06-09 DIAGNOSIS — Z90.710: ICD-10-CM

## 2020-06-09 PROCEDURE — 49560: CPT

## 2020-06-09 PROCEDURE — 88302 TISSUE EXAM BY PATHOLOGIST: CPT

## 2020-06-09 PROCEDURE — 82962 GLUCOSE BLOOD TEST: CPT

## 2020-06-09 RX ADMIN — FENTANYL CITRATE PRN MCG: 50 INJECTION, SOLUTION INTRAMUSCULAR; INTRAVENOUS at 10:48

## 2020-06-09 RX ADMIN — FENTANYL CITRATE PRN MCG: 50 INJECTION, SOLUTION INTRAMUSCULAR; INTRAVENOUS at 10:39

## 2020-06-09 NOTE — PCM.PREANE
Preanesthetic Assessment





- Anesthesia/Transfusion/Family Hx


Anesthesia History: Prior Anesthesia Without Reaction


Family History of Anesthesia Reaction: No


Transfusion History: No Prior Transfusion(s)


Intubation History: Unknown





- Review of Systems


General: No Symptoms


Pulmonary: No Symptoms


Cardiovascular: No Symptoms


Gastrointestinal: No Symptoms


Neurological: No Symptoms


Other: Reports: None





- Physical Assessment


NPO Status Date: 06/08/20


Height: 5 ft 5 in


Weight: 117.027 kg


ASA Class: 2


Mental Status: Alert & Oriented x3


Airway Class: Mallampati = 2


Dentition: Reports: Normal Dentition


ROM/Head Extension: Full


Lungs: Clear to Auscultation, Normal Respiratory Effort


Cardiovascular: Regular Rate, Regular Rhythm





- Lab


Values: 





 Laboratory Last Values











POC Glucose  109 mg/dL ()   06/09/20  07:05    














- Allergies


Allergies/Adverse Reactions: 


 Allergies











Allergy/AdvReac Type Severity Reaction Status Date / Time


 


codeine Allergy  Hives Verified 06/03/20 09:53














- Blood


Blood Available: No





- Anesthesia Plan


Pre-Op Medication Ordered: None





- Acknowledgements


Anesthesia Type Planned: Spinal


Pt an Appropriate Candidate for the Planned Anesthesia: Yes


Alternatives and Risks of Anesthesia Discussed w Pt/Guardian: Yes


Pt/Guardian Understands and Agrees with Anesthesia Plan: Yes


Additional Comments: 





PMH: obsity with bmi=43, DM2 with am glucose of 109, RAD with no exac x 2 

years. 


PLAN: spinal with sedation





PreAnesthesia Questionnaire


HEENT History: Reports: Allergic Rhinitis, Other (See Below)


Other HEENT History: wears glasses, chronic "sinus problems"


Cardiovascular History: Reports: None


Respiratory History: Reports: Asthma, Other (See Below)


Other Respiratory History: states asthma in the past, has chronic cough due to 

sinus drainage


Gastrointestinal History: Reports: Colon Polyp, Diverticulosis, GERD, 

Hemorrhoids


Genitourinary History: Reports: None


OB/GYN History: Reports: Dysfunctional Uterine Bleeding, Fibroids


Musculoskeletal History: Reports: None


Neurological History: Reports: None


Psychiatric History: Reports: None


Endocrine/Metabolic History: Reports: Diabetes, Type II, Obesity/BMI 30+


Hematologic History: Reports: Anemia


Immunologic History: Reports: None


Oncologic (Cancer) History: Reports: None


Dermatologic History: Reports: Other (See Below)


Other Dermatologic History: mole removed from right breast, precancerous





- Infectious Disease History


Infectious Disease History: Reports: Chicken Pox





- Past Surgical History


Head Surgeries/Procedures: Reports: None


HEENT Surgical History: Reports: None


Cardiovascular Surgical History: Reports: None


Respiratory Surgical History: Reports: None


GI Surgical History: Reports: Colonoscopy


Female  Surgical History: Reports: Breast Biopsy, Cervical Cryotherapy, 

Hysterectomy, Salpingo-Oophorectomy, Tubal Ligation


Other Female  Surgeries/Procedures: needle breast biopsies


Endocrine Surgical History: Reports: None


Neurological Surgical History: Reports: None


Musculoskeletal Surgical History: Reports: None


Oncologic Surgical History: Reports: None


Dermatological Surgical History: Reports: Skin Biopsy





- SUBSTANCE USE


Smoking Status *Q: Never Smoker





- HOME MEDS


Home Medications: 


 Home Meds





Multivitamin [Daily Multiple Vitamin] 1 tab PO DAILY 05/09/17 [History]


Omega-3/DHA/Epa/Fish Oil [Omega-3 Fish Oil 1,000 MG Sfgl] 1 tab PO ASDIRECTED 07 /07/17 [History]


Albuterol Sulfate [Albuterol Sulfate Hfa] 1 - 2 puff INH ASDIRECTED PRN 06/03/ 20 [History]


Benzonatate 100 mg PO TID PRN 06/03/20 [History]


Calcium Carbonate [Tums] 1 tab CHEW ASDIRECTED PRN 06/03/20 [History]


metFORMIN HCl [Metformin HCl ER] 500 mg PO BID 06/03/20 [History]


polyethylene glycoL 3350 [MiraLAX] 1 dose PO ASDIRECTED PRN 06/03/20 [History]











- CURRENT (IN HOUSE) MEDS


Current Meds: 





 Current Medications





Lactated Ringer's (Ringers, Lactated)  1,000 mls @ 125 mls/hr IV ASDIRECTED NOEMI


Sodium Chloride (Saline Flush)  10 ml FLUSH ASDIRECTED PRN


   PRN Reason: Keep Vein Open


Sodium Chloride (Saline Flush)  2.5 ml FLUSH ASDIRECTED PRN


   PRN Reason: Keep Vein Open


Sodium Chloride (Normal Saline)  10 ml IV ASDIRECTED PRN


   PRN Reason: IV Use





Discontinued Medications





Bupivacaine HCl (Sensorcaine-Mpf 0.5%) Confirm Administered Dose 10 ml .ROUTE 

.STK-MED ONE


   Stop: 06/09/20 07:13


Fentanyl (Sublimaze) Confirm Administered Dose 250 mcg .ROUTE .STK-MED ONE


   Stop: 06/09/20 07:02


Glycopyrrolate (Robinul) Confirm Administered Dose 0.4 mg .ROUTE .STK-MED ONE


   Stop: 06/09/20 07:02


Cefazolin Sodium/Dextrose 2 gm (/ Premix)  50 mls @ 100 mls/hr IV ONETIME ONE


   Stop: 06/08/20 14:48


Lidocaine (Xylocaine-Mpf 2%) Confirm Administered Dose 5 ml .ROUTE .STK-MED ONE


   Stop: 06/09/20 07:02


Midazolam HCl (Versed 1 Mg/Ml) Confirm Administered Dose 2 mg .ROUTE .STK-MED 

ONE


   Stop: 06/09/20 07:02


Propofol (Diprivan  20 Ml) Confirm Administered Dose 200 mg .ROUTE .STK-MED ONE


   Stop: 06/09/20 07:02


Rocuronium Bromide (Zemuron) Confirm Administered Dose 100 mg .ROUTE .STK-MED 

ONE


   Stop: 06/09/20 07:02

## 2020-06-09 NOTE — PCM.POSTAN
POST ANESTHESIA ASSESSMENT





- MENTAL STATUS


Mental Status: Alert, Oriented





- VITAL SIGNS


Vital Signs: 


 Last Vital Signs











Temp  37 C   06/09/20 10:25


 


Pulse  61   06/09/20 10:55


 


Resp  9 L  06/09/20 10:55


 


BP  98/69   06/09/20 10:55


 


Pulse Ox  96   06/09/20 10:55














- RESPIRATORY


Respiratory Status: Respiratory Rate WNL, Airway Patent, O2 Saturation Stable





- CARDIOVASCULAR


CV Status: Pulse Rate WNL, Blood Pressure Stable





- GASTROINTESTINAL


GI Status: No Symptoms





- PAIN


Pain Score: 2





- POST OP HYDRATION


Hydration Status: Adequate & Stable

## 2020-06-09 NOTE — [id number]
Post Anesthesia Note





- EVALUATION WITHIN 48HRS OF ANESTHETIC


Vital Signs in Normal Range: Yes


Patient Participated in Evaluation: Yes


Respiratory Function Stable: Yes


Airway Patent: Yes


Cardiovascular Function Stable: Yes


Hydration Status Stable: Yes


Pain Control Satisfactory: Yes (had 2 percocet in phase 2)


Nausea and Vomiting Control Satisfactory: Yes


Mental Status Recovered: Yes


Vital Signs: 


 Last Vital Signs











Temp  37 C   06/09/20 10:25


 


Pulse  68   06/09/20 12:00


 


Resp  16   06/09/20 12:00


 


BP  106/74   06/09/20 12:00


 


Pulse Ox  97   06/09/20 12:00

## 2020-06-09 NOTE — PCM.OPNOTE
- General Post-Op/Procedure Note


Date of Surgery/Procedure: 06/09/20


Operative Procedure(s): Repair incisional hernia


Findings: 





Incisional hernia in left lower quadrant containing omentum and epiploa from 

the colon. 


Pre Op Diagnosis: Incisional hernia


Post-Op Diagnosis: same


Anesthesia Technique: General LMA, Spinal


Primary Surgeon: Kelli Mera


Fluid Replacement, Intraop: 2,200


EBL in mLs: 10


Condition: Good

## 2020-06-10 NOTE — OR
SURGEON:

KELLI MERA MD

 

DATE OF PROCEDURE:  06/09/2020

 

PREOPERATIVE DIAGNOSIS:

Incisional hernia.

 

POSTOPERATIVE DIAGNOSIS:

Incisional hernia.

 

PROCEDURE PERFORMED:

Left lower quadrant incisional hernia repair.

 

PRIMARY SURGEON:

Kelli Mera MD

 

ANESTHESIA:

Spinal, local, general LMA.

 

FLUIDS:

2200 mL of crystalloid.

 

ESTIMATED BLOOD LOSS:

10 mL.

 

FINDINGS:

Incisional hernia in the left lower quadrant that acted like a spigelian hernia

and may have been a primary spigelian hernia, however, it was overlying a

previous incision site.  The hernia sac contained omentum and epiploic from the

colon.

 

COMPLICATIONS:

None.

 

INDICATIONS:

The patient is a 55-year-old female who several months ago noticed a bulge along

her left lower quadrant.  This was located over an area where she had a previous

port site from a laparoscopic hysterectomy.  CT scan of the abdomen showed a

spigelian hernia associated with the left lower quadrant bulge.  The hernia sac

appeared to contain fat that was pulling part of the sigmoid colon into the sac.

The patient and I discussed the need to repair this hernia.  I explained the

procedure, expected perioperative course as well as the risks including

bleeding, infection, or damage to surrounding structures.  We discussed both

mesh or non-mesh repair.  The patient verbalized understanding and wishes to

proceed.

 

PROCEDURE IN DETAIL:

The patient was brought into the OR and placed on the OR table in supine

position.  A time-out was completed verifying the patient's name, age, date of

birth, allergies, and procedure to be performed.  Spinal anesthesia was

performed, and the patient was given monitored anesthesia care.  The lower

abdomen and groin were prepped and draped in usual standard fashion.  After

adequate anesthesia was achieved, I palpated the left lower quadrant.  Again,

the hernia sac appeared to be underneath her previous left lower quadrant

incision.  I anesthetized the area with 0.5% Marcaine plain.  A left lower

quadrant oblique incision was made making sure to include her previous scar

along the incision.  This was performed using a 15 blade.  Cautery was used to

dissect down to the level of the subcutaneous fat.  I then dissected down to the

level of the fascia.  Just above the fascia, I noted a large hernia sac.  A

Rogers retractor was brought into the field and used to retract the patient's

subcutaneous tissues and allow exposure of the hernia sac.  The hernia sac was

dissected free from the surrounding tissues using Metzenbaum scissors.  Once I

had completely freed the hernia sac up from the overlying subcutaneous fat, I

dissected off the external oblique tissues.  I then divided the external oblique

along the direction of its fibers.  The hernia sac was quite large and had

pushed away and dissected through the underlying oblique muscles.  I continued

to very carefully dissect the hernia sac free from the surrounding tissue.  The

hernia sac traveled medially and went underneath the rectus muscles.  It was at

this level, I was able to finally identify the true fascial defect.  In order to

safely reduce the contents of the hernia sac, I first opened up the hernia sac

at this level.  The hernia sac was then dissected free from the contents

internally.  The hernia sac was transected and sent to pathology, labeled as

hernia sac.  The hernia sac contained a large amount of omentum, preperitoneal

fat, and some of the fat or pedicles from the colon.  The sigmoid colon was

pulled up at the level of the fascial defect, but not in the hernia sac itself.

The actual defect was only 1 cm in size.  In order to reduce the contents, I

opened up the defect laterally.  Once I did this, I was able to reduce all of

the contents back into the abdomen.  I then swept with my finger around the

peritoneal lining.  There were no attachments of any of the surrounding tissues

to the anterior abdominal wall.  Given how small the defect was, the decision

was made to close this primarily with sutures.  I first closed the peritoneal

lining with a running 0 Vicryl stitch.  I then closed the fascial defect as well

as the transverse abdominis and internal oblique with layers of interrupted 0

Ethibond suture.  I then reapproximated the external oblique aponeurosis with

interrupted 0 Ethibond sutures as well.  I then closed the subcutaneous fat

layer with interrupted layers of 3-0 Vicryl suture.  The skin was then closed

with a running 4-0 Monocryl stitch.  Dermabond and sterile dressings were

applied.  All counts were complete and correct at the end of the case.  During

the intraabdominal portion of the case, the patient became uncomfortable and so

general LMA anesthesia was induced.  The patient was extubated and taken to the

PACU in stable condition.

 

 

MEAGHAN HUGGINS

DD:  06/10/2020 08:52:34

DT:  06/10/2020 14:44:54

Job #:  [id number]

FEDERICO

## 2023-03-24 ENCOUNTER — HOSPITAL ENCOUNTER (OUTPATIENT)
Dept: HOSPITAL 56 - MW.SDS | Age: 58
Discharge: HOME | End: 2023-03-24
Attending: SURGERY
Payer: COMMERCIAL

## 2023-03-24 VITALS — DIASTOLIC BLOOD PRESSURE: 68 MMHG | HEART RATE: 74 BPM | SYSTOLIC BLOOD PRESSURE: 109 MMHG

## 2023-03-24 DIAGNOSIS — Z88.5: ICD-10-CM

## 2023-03-24 DIAGNOSIS — Z79.84: ICD-10-CM

## 2023-03-24 DIAGNOSIS — Z79.899: ICD-10-CM

## 2023-03-24 DIAGNOSIS — E66.01: ICD-10-CM

## 2023-03-24 DIAGNOSIS — K64.8: ICD-10-CM

## 2023-03-24 DIAGNOSIS — Z86.010: ICD-10-CM

## 2023-03-24 DIAGNOSIS — K57.30: ICD-10-CM

## 2023-03-24 DIAGNOSIS — Z12.11: Primary | ICD-10-CM

## 2023-03-24 DIAGNOSIS — E11.9: ICD-10-CM

## 2023-03-24 DIAGNOSIS — J45.20: ICD-10-CM

## 2023-03-24 PROCEDURE — 82947 ASSAY GLUCOSE BLOOD QUANT: CPT

## 2023-03-24 PROCEDURE — 45378 DIAGNOSTIC COLONOSCOPY: CPT
